# Patient Record
Sex: MALE | Race: WHITE | NOT HISPANIC OR LATINO | Employment: FULL TIME | URBAN - METROPOLITAN AREA
[De-identification: names, ages, dates, MRNs, and addresses within clinical notes are randomized per-mention and may not be internally consistent; named-entity substitution may affect disease eponyms.]

---

## 2018-05-03 ENCOUNTER — APPOINTMENT (OUTPATIENT)
Dept: CARDIOLOGY | Facility: HOSPITAL | Age: 33
End: 2018-05-03

## 2018-05-03 ENCOUNTER — HOSPITAL ENCOUNTER (EMERGENCY)
Facility: HOSPITAL | Age: 33
Discharge: HOME | End: 2018-05-03
Attending: EMERGENCY MEDICINE | Admitting: EMERGENCY MEDICINE

## 2018-05-03 VITALS
OXYGEN SATURATION: 98 % | HEART RATE: 72 BPM | RESPIRATION RATE: 16 BRPM | TEMPERATURE: 98.2 F | SYSTOLIC BLOOD PRESSURE: 126 MMHG | BODY MASS INDEX: 22.16 KG/M2 | WEIGHT: 182 LBS | HEIGHT: 76 IN | DIASTOLIC BLOOD PRESSURE: 80 MMHG

## 2018-05-03 DIAGNOSIS — I82.4Z1 ACUTE DEEP VEIN THROMBOSIS (DVT) OF DISTAL VEIN OF RIGHT LOWER EXTREMITY (HCC): Primary | ICD-10-CM

## 2018-05-03 LAB
ALBUMIN SERPL-MCNC: 4.3 G/DL (ref 3.5–5.2)
ALBUMIN/GLOB SERPL: 1.4 G/DL
ALP SERPL-CCNC: 53 U/L (ref 39–117)
ALT SERPL W P-5'-P-CCNC: 12 U/L (ref 1–41)
ANION GAP SERPL CALCULATED.3IONS-SCNC: 11.6 MMOL/L
AST SERPL-CCNC: 16 U/L (ref 1–40)
BASOPHILS # BLD AUTO: 0.02 10*3/MM3 (ref 0–0.2)
BASOPHILS NFR BLD AUTO: 0.3 % (ref 0–1.5)
BILIRUB SERPL-MCNC: 0.6 MG/DL (ref 0.1–1.2)
BUN BLD-MCNC: 14 MG/DL (ref 6–20)
BUN/CREAT SERPL: 12.4 (ref 7–25)
CALCIUM SPEC-SCNC: 9.4 MG/DL (ref 8.6–10.5)
CHLORIDE SERPL-SCNC: 101 MMOL/L (ref 98–107)
CO2 SERPL-SCNC: 29.4 MMOL/L (ref 22–29)
CREAT BLD-MCNC: 1.13 MG/DL (ref 0.76–1.27)
DEPRECATED RDW RBC AUTO: 40.1 FL (ref 37–54)
EOSINOPHIL # BLD AUTO: 0.2 10*3/MM3 (ref 0–0.7)
EOSINOPHIL NFR BLD AUTO: 2.8 % (ref 0.3–6.2)
ERYTHROCYTE [DISTWIDTH] IN BLOOD BY AUTOMATED COUNT: 12.6 % (ref 11.5–14.5)
GFR SERPL CREATININE-BSD FRML MDRD: 75 ML/MIN/1.73
GFR SERPL CREATININE-BSD FRML MDRD: 91 ML/MIN/1.73
GLOBULIN UR ELPH-MCNC: 3 GM/DL
GLUCOSE BLD-MCNC: 93 MG/DL (ref 65–99)
HCT VFR BLD AUTO: 42.6 % (ref 40.4–52.2)
HGB BLD-MCNC: 14.6 G/DL (ref 13.7–17.6)
HOLD SPECIMEN: NORMAL
HOLD SPECIMEN: NORMAL
IMM GRANULOCYTES # BLD: 0.02 10*3/MM3 (ref 0–0.03)
IMM GRANULOCYTES NFR BLD: 0.3 % (ref 0–0.5)
LYMPHOCYTES # BLD AUTO: 2.46 10*3/MM3 (ref 0.9–4.8)
LYMPHOCYTES NFR BLD AUTO: 35 % (ref 19.6–45.3)
MCH RBC QN AUTO: 30.3 PG (ref 27–32.7)
MCHC RBC AUTO-ENTMCNC: 34.3 G/DL (ref 32.6–36.4)
MCV RBC AUTO: 88.4 FL (ref 79.8–96.2)
MONOCYTES # BLD AUTO: 0.74 10*3/MM3 (ref 0.2–1.2)
MONOCYTES NFR BLD AUTO: 10.5 % (ref 5–12)
NEUTROPHILS # BLD AUTO: 3.6 10*3/MM3 (ref 1.9–8.1)
NEUTROPHILS NFR BLD AUTO: 51.4 % (ref 42.7–76)
PLATELET # BLD AUTO: 239 10*3/MM3 (ref 140–500)
PMV BLD AUTO: 10.4 FL (ref 6–12)
POTASSIUM BLD-SCNC: 4.1 MMOL/L (ref 3.5–5.2)
PROT SERPL-MCNC: 7.3 G/DL (ref 6–8.5)
RBC # BLD AUTO: 4.82 10*6/MM3 (ref 4.6–6)
SODIUM BLD-SCNC: 142 MMOL/L (ref 136–145)
WBC NRBC COR # BLD: 7.02 10*3/MM3 (ref 4.5–10.7)
WHOLE BLOOD HOLD SPECIMEN: NORMAL
WHOLE BLOOD HOLD SPECIMEN: NORMAL

## 2018-05-03 PROCEDURE — 99284 EMERGENCY DEPT VISIT MOD MDM: CPT

## 2018-05-03 PROCEDURE — 85025 COMPLETE CBC W/AUTO DIFF WBC: CPT

## 2018-05-03 PROCEDURE — 80053 COMPREHEN METABOLIC PANEL: CPT

## 2018-05-03 PROCEDURE — 93971 EXTREMITY STUDY: CPT

## 2018-05-03 PROCEDURE — 36415 COLL VENOUS BLD VENIPUNCTURE: CPT

## 2018-05-03 RX ADMIN — RIVAROXABAN 15 MG: 15 TABLET, FILM COATED ORAL at 22:03

## 2018-05-04 ENCOUNTER — TELEPHONE (OUTPATIENT)
Dept: SOCIAL WORK | Facility: HOSPITAL | Age: 33
End: 2018-05-04

## 2018-05-04 LAB
BH CV LOW VAS RIGHT GASTRONEMIUS VESSEL: 1
BH CV LOWER VASCULAR LEFT COMMON FEMORAL AUGMENT: NORMAL
BH CV LOWER VASCULAR LEFT COMMON FEMORAL COMPETENT: NORMAL
BH CV LOWER VASCULAR LEFT COMMON FEMORAL COMPRESS: NORMAL
BH CV LOWER VASCULAR LEFT COMMON FEMORAL PHASIC: NORMAL
BH CV LOWER VASCULAR LEFT COMMON FEMORAL SPONT: NORMAL
BH CV LOWER VASCULAR RIGHT COMMON FEMORAL AUGMENT: NORMAL
BH CV LOWER VASCULAR RIGHT COMMON FEMORAL COMPETENT: NORMAL
BH CV LOWER VASCULAR RIGHT COMMON FEMORAL COMPRESS: NORMAL
BH CV LOWER VASCULAR RIGHT COMMON FEMORAL PHASIC: NORMAL
BH CV LOWER VASCULAR RIGHT COMMON FEMORAL SPONT: NORMAL
BH CV LOWER VASCULAR RIGHT DISTAL FEMORAL COMPRESS: NORMAL
BH CV LOWER VASCULAR RIGHT GASTRONEMIUS COMPRESS: NORMAL
BH CV LOWER VASCULAR RIGHT GASTRONEMIUS THROMBUS: NORMAL
BH CV LOWER VASCULAR RIGHT GREATER SAPH AK COMPRESS: NORMAL
BH CV LOWER VASCULAR RIGHT GREATER SAPH BK COMPRESS: NORMAL
BH CV LOWER VASCULAR RIGHT MID FEMORAL AUGMENT: NORMAL
BH CV LOWER VASCULAR RIGHT MID FEMORAL COMPETENT: NORMAL
BH CV LOWER VASCULAR RIGHT MID FEMORAL COMPRESS: NORMAL
BH CV LOWER VASCULAR RIGHT MID FEMORAL PHASIC: NORMAL
BH CV LOWER VASCULAR RIGHT MID FEMORAL SPONT: NORMAL
BH CV LOWER VASCULAR RIGHT PERONEAL COMPRESS: NORMAL
BH CV LOWER VASCULAR RIGHT POPLITEAL AUGMENT: NORMAL
BH CV LOWER VASCULAR RIGHT POPLITEAL COMPETENT: NORMAL
BH CV LOWER VASCULAR RIGHT POPLITEAL COMPRESS: NORMAL
BH CV LOWER VASCULAR RIGHT POPLITEAL PHASIC: NORMAL
BH CV LOWER VASCULAR RIGHT POPLITEAL SPONT: NORMAL
BH CV LOWER VASCULAR RIGHT POSTERIOR TIBIAL COMPRESS: NORMAL
BH CV LOWER VASCULAR RIGHT PROXIMAL FEMORAL COMPRESS: NORMAL
BH CV LOWER VASCULAR RIGHT SAPHENOFEMORAL JUNCTION AUGMENT: NORMAL
BH CV LOWER VASCULAR RIGHT SAPHENOFEMORAL JUNCTION COMPRESS: NORMAL
BH CV LOWER VASCULAR RIGHT SAPHENOFEMORAL JUNCTION PHASIC: NORMAL
BH CV LOWER VASCULAR RIGHT SAPHENOFEMORAL JUNCTION SPONT: NORMAL

## 2018-05-04 NOTE — DISCHARGE INSTRUCTIONS
You are advised to follow closely with Primary doctor of your choice in 2-3 days for recheck, final results of lab work and imaging testing, and further testing/treatment as needed.    Please take xarelto as directed twice daily, then you will need to switch to once daily dosing.    Please return to the emergency department immediately with chest pain, shortness of air, abdominal pain, persistent vomiting/fever, blood in emesis or stool, lightheadedness/fainting, problems with speech, one sided weakness/numbness, new incontinence, problems with vision, bleeding or for worsening of symptoms or other concerns.

## 2018-05-04 NOTE — TELEPHONE ENCOUNTER
ER F/U phone call:   Pt states that he was able to get a PCP appointment on 5-9-18 and that he had no difficulty obtaining his Xarelto. No other questions or concerns voiced at this time. Greta Reeder RN

## 2018-05-04 NOTE — ED PROVIDER NOTES
" [facility] EMERGENCY DEPARTMENT ENCOUNTER    CHIEF COMPLAINT  Chief Complaint: RLE pain  History given by: patient  History limited by: nothing  [facility] Room Number: 49/49  PMD: No Known Provider      HPI:  Pt is a 33 y.o. male who presents complaining of right lower leg pain for about 3 days that is worsened with palpation and movement. Pt states he believes he has blood clots in his leg due to his father having Factor V Leiden issue. He reports the sensation of his R calf \"feels different\" against his pants. Pt is not on blood thinners. He denies CP, palpitations, lightheadness or SOA. Pt has not had any long distance travel recently.    Onset: gradual  Duration: 3 days  Severity: moderate  Associated symptoms: different sensation of right lower leg  Previous treatment: Pt reports no treatment PTA.    PAST MEDICAL HISTORY  Active Ambulatory Problems     Diagnosis Date Noted   • No Active Ambulatory Problems     Resolved Ambulatory Problems     Diagnosis Date Noted   • No Resolved Ambulatory Problems     No Additional Past Medical History       PAST SURGICAL HISTORY  History reviewed. No pertinent surgical history.    FAMILY HISTORY  History reviewed. No pertinent family history.    SOCIAL HISTORY  Social History     Social History   • Marital status: Single     Spouse name: N/A   • Number of children: N/A   • Years of education: N/A     Occupational History   • Not on file.     Social History Main Topics   • Smoking status: Never Smoker   • Smokeless tobacco: Not on file   • Alcohol use Yes      Comment: occationally   • Drug use: No   • Sexual activity: Defer     Other Topics Concern   • Not on file     Social History Narrative   • No narrative on file       ALLERGIES  Review of patient's allergies indicates no known allergies.    REVIEW OF SYSTEMS  Review of Systems   Constitutional: Negative for fever.   HENT: Negative for nosebleeds.    Eyes: Negative for visual disturbance.   Respiratory: Negative for shortness of " breath.    Cardiovascular: Negative for chest pain.   Gastrointestinal: Negative for abdominal pain and blood in stool.   Genitourinary: Negative for hematuria.   Musculoskeletal: Positive for myalgias (right calf).   Neurological: Negative for light-headedness.   Psychiatric/Behavioral: Negative for confusion.       PHYSICAL EXAM  ED Triage Vitals   Temp Heart Rate Resp BP SpO2   05/03/18 1917 05/03/18 1917 05/03/18 1917 05/03/18 1934 05/03/18 1917   98.2 °F (36.8 °C) 101 16 143/77 98 %      Temp src Heart Rate Source Patient Position BP Location FiO2 (%)   05/03/18 1917 05/03/18 1917 05/03/18 1934 -- --   Tympanic Monitor Sitting         Physical Exam   Constitutional: He is oriented to person, place, and time. No distress.   HENT:   Head: Normocephalic and atraumatic.   Eyes: EOM are normal.   Neck: Normal range of motion.   Cardiovascular: Normal rate, regular rhythm and normal heart sounds.    No murmur heard.  Pulses:       Posterior tibial pulses are 2+ on the right side, and 2+ on the left side.   HR 70s   Pulmonary/Chest: Effort normal and breath sounds normal. No respiratory distress. He has no wheezes.   Lungs are CTAB   Abdominal: Soft. Bowel sounds are normal. There is no tenderness. There is no rebound and no guarding.   Musculoskeletal: Normal range of motion. He exhibits no edema.   Mild right calf tenderness.   Neurological: He is alert and oriented to person, place, and time.   Skin: Skin is warm and dry.   Psychiatric: Affect normal.   Nursing note and vitals reviewed.      LAB RESULTS  Lab Results (last 24 hours)     Procedure Component Value Units Date/Time    CBC & Differential [[id number]] Collected:  05/03/18 1944    Specimen:  Blood Updated:  05/03/18 1955    Narrative:       The following orders were created for panel order CBC & Differential.  Procedure                               Abnormality         Status                     ---------                               -----------          ------                     CBC Auto Differential[[id number]]        Normal              Final result                 Please view results for these tests on the individual orders.    Comprehensive Metabolic Panel [[id number]]  (Abnormal) Collected:  05/03/18 1944    Specimen:  Blood Updated:  05/03/18 2016     Glucose 93 mg/dL      BUN 14 mg/dL      Creatinine 1.13 mg/dL      Sodium 142 mmol/L      Potassium 4.1 mmol/L      Chloride 101 mmol/L      CO2 29.4 (H) mmol/L      Calcium 9.4 mg/dL      Total Protein 7.3 g/dL      Albumin 4.30 g/dL      ALT (SGPT) 12 U/L      AST (SGOT) 16 U/L      Alkaline Phosphatase 53 U/L      Total Bilirubin 0.6 mg/dL      eGFR Non African Amer 75 mL/min/1.73      eGFR  African Amer 91 mL/min/1.73      Globulin 3.0 gm/dL      A/G Ratio 1.4 g/dL      BUN/Creatinine Ratio 12.4     Anion Gap 11.6 mmol/L     CBC Auto Differential [[id number]]  (Normal) Collected:  05/03/18 1944    Specimen:  Blood Updated:  05/03/18 1955     WBC 7.02 10*3/mm3      RBC 4.82 10*6/mm3      Hemoglobin 14.6 g/dL      Hematocrit 42.6 %      MCV 88.4 fL      MCH 30.3 pg      MCHC 34.3 g/dL      RDW 12.6 %      RDW-SD 40.1 fl      MPV 10.4 fL      Platelets 239 10*3/mm3      Neutrophil % 51.4 %      Lymphocyte % 35.0 %      Monocyte % 10.5 %      Eosinophil % 2.8 %      Basophil % 0.3 %      Immature Grans % 0.3 %      Neutrophils, Absolute 3.60 10*3/mm3      Lymphocytes, Absolute 2.46 10*3/mm3      Monocytes, Absolute 0.74 10*3/mm3      Eosinophils, Absolute 0.20 10*3/mm3      Basophils, Absolute 0.02 10*3/mm3      Immature Grans, Absolute 0.02 10*3/mm3           I ordered the above labs and reviewed the results      PROCEDURES  Procedures      PROGRESS AND CONSULTS  ED Course   2129  Notified pt of calf clot in right leg. Discussed plan to d/c home on blood thinner and discuss options with his brother, who is an endocrinologist since pt does not have PCP.  PT denies any history of bleeding, recent  trauma/surgeries.    2137  Discussed pt's case and doppler result with pt's brother who agrees with plan of care. Will place pt on xarelto. Pt understands and agrees with the plan to follow closely for recheck and further testing, treatment as needed including hematology and PCP follow up, all questions answered.    2148  CCP at bedside discussing medication and referral for PCP.      MEDICAL DECISION MAKING  Results were reviewed/discussed with the patient and they were also made aware of online access. Pt also made aware that some labs, such as cultures, will not be resulted during ER visit and follow up with PMD is necessary.     MDM  Number of Diagnoses or Management Options  Acute deep vein thrombosis (DVT) of distal vein of right lower extremity:      Amount and/or Complexity of Data Reviewed  Clinical lab tests: ordered and reviewed (Labs are unremarkable.)  Tests in the radiology section of CPT®: ordered and reviewed (Doppler of RLE is positive for new calf DVT.)  Discuss the patient with other providers: yes (Pt's brother (endocrinologist))  Independent visualization of images, tracings, or specimens: yes    Patient Progress  Patient progress: stable         DIAGNOSIS  Final diagnoses:   Acute deep vein thrombosis (DVT) of distal vein of right lower extremity       DISPOSITION  DISCHARGE    Patient discharged in stable condition.    Reviewed implications of results, diagnosis, meds, responsibility to follow up, warning signs and symptoms of possible worsening, potential complications and reasons to return to ER.    Patient/Family voiced understanding of above instructions.    Discussed plan for discharge, as there is no emergent indication for admission. Patient referred to primary care provider for BP management due to today's BP. Pt/family is agreeable and understands need for follow up and repeat testing.  Pt is aware that discharge does not mean that nothing is wrong but it indicates no emergency is  present that requires admission and they must continue care with follow-up as given below or physician of their choice.     FOLLOW-UP  [facility] PHYS[facility] REFERRAL SERVICE  [place] [address]  [phone]  Schedule an appointment as soon as possible for a visit in 3 days  [facility]    [facility] [place] [address]  [phone]  Schedule an appointment as soon as possible for a visit in 3 days      Jorgito Fernández MD  [address]  [place] [address]  [phone]    Schedule an appointment as soon as possible for a visit in 1 week  As needed         Medication List      New Prescriptions    rivaroxaban 15 MG tablet  Commonly known as:  XARELTO  Take 1 tablet by mouth 2 (Two) Times a Day With Meals.          Latest Documented Vital Signs:  As of 9:51 PM  BP- 129/85 HR- 64 Temp- 98.2 °F (36.8 °C) (Tympanic) O2 sat- 97%    --  Documentation assistance provided by ghassan Stevens for Dr. Oviedo.  Information recorded by the scribe was done at my direction and has been verified and validated by me.     Luz Elena Stevens  05/03/18 0153       Inocencia Oviedo MD  05/04/18 6790

## 2018-05-04 NOTE — PROGRESS NOTES
Discharge Planning Assessment   [facility]     Patient Name: Cristian Feliciano  MRN: [id number]  Today's Date: 5/3/2018    Admit Date: 5/3/2018          Discharge Needs Assessment    No documentation.             Discharge Plan     Row Name 05/03/18 2153       Plan    Final Note Xarelto packet of information with discount card given to pt. Educated pt on how to use the discount card. Also [facility] list given to pt with instructions on how to use the referral line.        Destination     No service coordination in this encounter.      Durable Medical Equipment     No service coordination in this encounter.      Dialysis/Infusion     No service coordination in this encounter.      Home Medical Care     No service coordination in this encounter.      Social Care     No service coordination in this encounter.                Demographic Summary    No documentation.           Functional Status    No documentation.           Psychosocial    No documentation.           Abuse/Neglect    No documentation.           Legal    No documentation.           Substance Abuse    No documentation.           Patient Forms    No documentation.         Greta Reeder RN

## 2018-05-04 NOTE — ED NOTES
Pt states his right leg felt different a couple days ago and has gradually gotten more uncomfortable, so pt came to the ER.      Shanon Lay RN  05/03/18 7482

## 2018-05-04 NOTE — PROGRESS NOTES
EV doppler completed.  Preliminary results:  RT leg is positive for new calf DVT with no extension into the popliteal.  Results given to Janes Golden.

## 2018-05-09 ENCOUNTER — OFFICE VISIT (OUTPATIENT)
Dept: FAMILY MEDICINE CLINIC | Facility: CLINIC | Age: 33
End: 2018-05-09

## 2018-05-09 VITALS
HEIGHT: 76 IN | SYSTOLIC BLOOD PRESSURE: 132 MMHG | HEART RATE: 65 BPM | DIASTOLIC BLOOD PRESSURE: 50 MMHG | OXYGEN SATURATION: 99 % | BODY MASS INDEX: 22.53 KG/M2 | WEIGHT: 185 LBS

## 2018-05-09 DIAGNOSIS — I82.401 ACUTE DEEP VEIN THROMBOSIS (DVT) OF RIGHT LOWER EXTREMITY, UNSPECIFIED VEIN (HCC): Primary | ICD-10-CM

## 2018-05-09 PROCEDURE — 99202 OFFICE O/P NEW SF 15 MIN: CPT | Performed by: NURSE PRACTITIONER

## 2018-05-09 NOTE — PROGRESS NOTES
"Cristian Feliciano is a 33 y.o. male.Patient presents for a hospital follow up. Cristian was seen at [facility] ER on 5/3/18 for a DVT of the right lower extremity. He was placed on Xarelto and referred to hematology, but didn't realize that he was supposed to make the follow up appointment himself.  Reports that his father has Factor 5 leiden    Assessment/Plan   Problem List Items Addressed This Visit     None      Visit Diagnoses     Acute deep vein thrombosis (DVT) of right lower extremity, unspecified vein    -  Primary    Relevant Orders    Ambulatory Referral to Hematology             No Follow-up on file.  There are no Patient Instructions on file for this visit.    Chief Complaint   Patient presents with   • Deep Vein Thrombosis     Social History   Substance Use Topics   • Smoking status: Never Smoker   • Smokeless tobacco: Not on file   • Alcohol use Yes      Comment: occationally       History of Present Illness     The following portions of the patient's history were reviewed and updated as appropriate:PMHroutine: Social history , Allergies, Current Medications, Active Problem List, Family History and Health Maintenance    Review of Systems   Constitutional: Negative for activity change and appetite change.   Respiratory: Negative for cough and shortness of breath.    Cardiovascular: Negative for chest pain.   Musculoskeletal: Negative for myalgias.       Objective   Vitals:    05/09/18 1345   BP: 132/50   Pulse: 65   SpO2: 99%   Weight: 83.9 kg (185 lb)   Height: 193 cm (76\")     Body mass index is 22.52 kg/m².  Physical Exam   Constitutional: He appears well-developed and well-nourished. No distress.   HENT:   Head: Normocephalic and atraumatic.   Cardiovascular: Normal rate and regular rhythm.    Pulmonary/Chest: Effort normal.   Musculoskeletal: Normal range of motion. He exhibits no edema or tenderness.   Neurological: He is alert.   Skin: Skin is warm.   Nursing note and vitals reviewed.    Reviewed " Data:  Admission on 05/03/2018, Discharged on 05/03/2018   Component Date Value Ref Range Status   • Glucose 05/03/2018 93  65 - 99 mg/dL Final   • BUN 05/03/2018 14  6 - 20 mg/dL Final   • Creatinine 05/03/2018 1.13  0.76 - 1.27 mg/dL Final   • Sodium 05/03/2018 142  136 - 145 mmol/L Final   • Potassium 05/03/2018 4.1  3.5 - 5.2 mmol/L Final   • Chloride 05/03/2018 101  98 - 107 mmol/L Final   • CO2 05/03/2018 29.4* 22.0 - 29.0 mmol/L Final   • Calcium 05/03/2018 9.4  8.6 - 10.5 mg/dL Final   • Total Protein 05/03/2018 7.3  6.0 - 8.5 g/dL Final   • Albumin 05/03/2018 4.30  3.50 - 5.20 g/dL Final   • ALT (SGPT) 05/03/2018 12  1 - 41 U/L Final   • AST (SGOT) 05/03/2018 16  1 - 40 U/L Final   • Alkaline Phosphatase 05/03/2018 53  39 - 117 U/L Final   • Total Bilirubin 05/03/2018 0.6  0.1 - 1.2 mg/dL Final   • eGFR Non African Amer 05/03/2018 75  >60 mL/min/1.73 Final   • eGFR   Amer 05/03/2018 91  >60 mL/min/1.73 Final   • Globulin 05/03/2018 3.0  gm/dL Final   • A/G Ratio 05/03/2018 1.4  g/dL Final   • BUN/Creatinine Ratio 05/03/2018 12.4  7.0 - 25.0 Final   • Anion Gap 05/03/2018 11.6  mmol/L Final   • Right GastronemiusSoleal Vessel 05/04/2018 1   Final   • Right Common Femoral Spont 05/04/2018 Y   Final   • Right Common Femoral Phasic 05/04/2018 Y   Final   • Right Common Femoral Augment 05/04/2018 Y   Final   • Right Common Femoral Competent 05/04/2018 Y   Final   • Right Common Femoral Compress 05/04/2018 C   Final   • Right Saphenofemoral Junction Spont 05/04/2018 Y   Final   • Right Saphenofemoral Junction Phas* 05/04/2018 Y   Final   • Right Saphenofemoral Junction Augm* 05/04/2018 Y   Final   • Right Saphenofemoral Junction Comp* 05/04/2018 C   Final   • Right Proximal Femoral Compress 05/04/2018 C   Final   • Right Mid Femoral Spont 05/04/2018 Y   Final   • Right Mid Femoral Phasic 05/04/2018 Y   Final   • Right Mid Femoral Augment 05/04/2018 Y   Final   • Right Mid Femoral Competent 05/04/2018 Y    Final   • Right Mid Femoral Compress 05/04/2018 C   Final   • Right Distal Femoral Compress 05/04/2018 C   Final   • Right Popliteal Spont 05/04/2018 Y   Final   • Right Popliteal Phasic 05/04/2018 Y   Final   • Right Popliteal Augment 05/04/2018 Y   Final   • Right Popliteal Competent 05/04/2018 Y   Final   • Right Popliteal Compress 05/04/2018 C   Final   • Right Posterior Tibial Compress 05/04/2018 C   Final   • Right Peroneal Compress 05/04/2018 C   Final   • Right GastronemiusSoleal Thrombus 05/04/2018 A   Final   • Right Greater Saph AK Compress 05/04/2018 C   Final   • Right Greater Saph BK Compress 05/04/2018 C   Final   • Left Common Femoral Spont 05/04/2018 Y   Final   • Left Common Femoral Phasic 05/04/2018 Y   Final   • Left Common Femoral Augment 05/04/2018 Y   Final   • Left Common Femoral Competent 05/04/2018 Y   Final   • Left Common Femoral Compress 05/04/2018 C   Final   • Right GastronemiusSoleal Compress 05/04/2018 N   Final   • Extra Tube 05/03/2018 hold for add-on   Final   • Extra Tube 05/03/2018 Hold for add-ons.   Final   • Extra Tube 05/03/2018 hold for add-on   Final   • Extra Tube 05/03/2018 Hold for add-ons.   Final   • WBC 05/03/2018 7.02  4.50 - 10.70 10*3/mm3 Final   • RBC 05/03/2018 4.82  4.60 - 6.00 10*6/mm3 Final   • Hemoglobin 05/03/2018 14.6  13.7 - 17.6 g/dL Final   • Hematocrit 05/03/2018 42.6  40.4 - 52.2 % Final   • MCV 05/03/2018 88.4  79.8 - 96.2 fL Final   • MCH 05/03/2018 30.3  27.0 - 32.7 pg Final   • MCHC 05/03/2018 34.3  32.6 - 36.4 g/dL Final   • RDW 05/03/2018 12.6  11.5 - 14.5 % Final   • RDW-SD 05/03/2018 40.1  37.0 - 54.0 fl Final   • MPV 05/03/2018 10.4  6.0 - 12.0 fL Final   • Platelets 05/03/2018 239  140 - 500 10*3/mm3 Final   • Neutrophil % 05/03/2018 51.4  42.7 - 76.0 % Final   • Lymphocyte % 05/03/2018 35.0  19.6 - 45.3 % Final   • Monocyte % 05/03/2018 10.5  5.0 - 12.0 % Final   • Eosinophil % 05/03/2018 2.8  0.3 - 6.2 % Final   • Basophil % 05/03/2018 0.3   0.0 - 1.5 % Final   • Immature Grans % 05/03/2018 0.3  0.0 - 0.5 % Final   • Neutrophils, Absolute 05/03/2018 3.60  1.90 - 8.10 10*3/mm3 Final   • Lymphocytes, Absolute 05/03/2018 2.46  0.90 - 4.80 10*3/mm3 Final   • Monocytes, Absolute 05/03/2018 0.74  0.20 - 1.20 10*3/mm3 Final   • Eosinophils, Absolute 05/03/2018 0.20  0.00 - 0.70 10*3/mm3 Final   • Basophils, Absolute 05/03/2018 0.02  0.00 - 0.20 10*3/mm3 Final   • Immature Grans, Absolute 05/03/2018 0.02  0.00 - 0.03 10*3/mm3 Final     Referral to hematology

## 2018-05-29 RX ORDER — RIVAROXABAN 15 MG/1
TABLET, FILM COATED ORAL
Qty: 20 TABLET | Refills: 0 | Status: SHIPPED | OUTPATIENT
Start: 2018-05-29 | End: 2018-05-31

## 2018-05-31 ENCOUNTER — CONSULT (OUTPATIENT)
Dept: ONCOLOGY | Facility: CLINIC | Age: 33
End: 2018-05-31

## 2018-05-31 ENCOUNTER — LAB (OUTPATIENT)
Dept: LAB | Facility: HOSPITAL | Age: 33
End: 2018-05-31

## 2018-05-31 VITALS
HEIGHT: 76 IN | HEART RATE: 72 BPM | BODY MASS INDEX: 22.77 KG/M2 | TEMPERATURE: 98.8 F | RESPIRATION RATE: 16 BRPM | DIASTOLIC BLOOD PRESSURE: 80 MMHG | OXYGEN SATURATION: 97 % | SYSTOLIC BLOOD PRESSURE: 120 MMHG | WEIGHT: 187 LBS

## 2018-05-31 DIAGNOSIS — I82.90 ACUTE DEEP VEIN THROMBOSIS (DVT) OF NON-EXTREMITY VEIN: Primary | ICD-10-CM

## 2018-05-31 LAB
BASOPHILS # BLD AUTO: 0.04 10*3/MM3 (ref 0–0.1)
BASOPHILS NFR BLD AUTO: 0.7 % (ref 0–1.1)
D DIMER PPP FEU-MCNC: <0.27 MCGFEU/ML (ref 0–0.49)
DEPRECATED RDW RBC AUTO: 36.7 FL (ref 37–49)
EOSINOPHIL # BLD AUTO: 0.15 10*3/MM3 (ref 0–0.36)
EOSINOPHIL NFR BLD AUTO: 2.6 % (ref 1–5)
ERYTHROCYTE [DISTWIDTH] IN BLOOD BY AUTOMATED COUNT: 12.1 % (ref 11.7–14.5)
F5 GENE MUT ANL BLD/T: ABNORMAL
FACTOR II, DNA ANALYSIS: NORMAL
HCT VFR BLD AUTO: 43.1 % (ref 40–49)
HGB BLD-MCNC: 15.6 G/DL (ref 13.5–16.5)
IMM GRANULOCYTES # BLD: 0.06 10*3/MM3 (ref 0–0.03)
IMM GRANULOCYTES NFR BLD: 1 % (ref 0–0.5)
LYMPHOCYTES # BLD AUTO: 2.32 10*3/MM3 (ref 1–3.5)
LYMPHOCYTES NFR BLD AUTO: 39.9 % (ref 20–49)
MCH RBC QN AUTO: 30.5 PG (ref 27–33)
MCHC RBC AUTO-ENTMCNC: 36.2 G/DL (ref 32–35)
MCV RBC AUTO: 84.3 FL (ref 83–97)
MONOCYTES # BLD AUTO: 0.56 10*3/MM3 (ref 0.25–0.8)
MONOCYTES NFR BLD AUTO: 9.6 % (ref 4–12)
NEUTROPHILS # BLD AUTO: 2.69 10*3/MM3 (ref 1.5–7)
NEUTROPHILS NFR BLD AUTO: 46.2 % (ref 39–75)
NRBC BLD MANUAL-RTO: 0 /100 WBC (ref 0–0)
PLATELET # BLD AUTO: 241 10*3/MM3 (ref 150–375)
PMV BLD AUTO: 10.4 FL (ref 8.9–12.1)
RBC # BLD AUTO: 5.11 10*6/MM3 (ref 4.3–5.5)
WBC NRBC COR # BLD: 5.82 10*3/MM3 (ref 4–10)

## 2018-05-31 PROCEDURE — 85306 CLOT INHIBIT PROT S FREE: CPT | Performed by: INTERNAL MEDICINE

## 2018-05-31 PROCEDURE — 85379 FIBRIN DEGRADATION QUANT: CPT | Performed by: INTERNAL MEDICINE

## 2018-05-31 PROCEDURE — 85240 CLOT FACTOR VIII AHG 1 STAGE: CPT | Performed by: INTERNAL MEDICINE

## 2018-05-31 PROCEDURE — 36415 COLL VENOUS BLD VENIPUNCTURE: CPT | Performed by: INTERNAL MEDICINE

## 2018-05-31 PROCEDURE — 81241 F5 GENE: CPT | Performed by: INTERNAL MEDICINE

## 2018-05-31 PROCEDURE — 81240 F2 GENE: CPT | Performed by: INTERNAL MEDICINE

## 2018-05-31 PROCEDURE — 99204 OFFICE O/P NEW MOD 45 MIN: CPT | Performed by: INTERNAL MEDICINE

## 2018-05-31 PROCEDURE — 86038 ANTINUCLEAR ANTIBODIES: CPT | Performed by: INTERNAL MEDICINE

## 2018-05-31 PROCEDURE — 85303 CLOT INHIBIT PROT C ACTIVITY: CPT | Performed by: INTERNAL MEDICINE

## 2018-05-31 PROCEDURE — 85025 COMPLETE CBC W/AUTO DIFF WBC: CPT | Performed by: INTERNAL MEDICINE

## 2018-05-31 NOTE — PROGRESS NOTES
Subjective significant improvement in pain involving the right calf    REASON FOR CONSULTATION:  Right lower extremity DVT  Provide an opinion on any further workup or treatment                             REQUESTING PHYSICIAN:  Greta MOLINA    RECORDS OBTAINED:  Records of the patients history including those obtained from the referring provider were reviewed and summarized in detail.    HISTORY OF PRESENT ILLNESS:  The patient is a 33 y.o. year old male who is here for an opinion about the above issue.    History of Present Illness     The patient is a 33-year-old male with little past medical history who it noted just prior to emergency room visit beginning of May development of pain in his right lower extremity for 3 days.  This was worsening with movement and he became concerned there being a family history of factor V Leiden's father.  A Doppler examination in the emergency room showed acute right lower extremity DVT and gastrocnemius/soleal and the patient was placed on Xarelto.  Testing for factor V Leiden does not appear to have been obtained.      The patient describes that his father is positive for factor V Leiden and that this is been known for many years with his father being treated long-term on warfarin.  As result of needing orthopedic procedures and bridging between these surgeries he has an IVC filter also in place there being instances where he evidently developed new thrombi.  There is no significant family history otherwise though his mother does have cardiovascular disease and is status post pacemaker placement.    The patient himself works in [employer] after previously working as a  upon obtaining his degree.  His current occupation has him traveling frequently by  car for short trips with her also occasions, including prior to his development of DVT, where he was relatively sedentary for many hours over several days at a desk.  He did not have any longer trips in  and around the development of his DVT where he was immobile, had had no recent surgery or injury that led to immobility.  He does describe previously injuring his ankle many years ago and being mobilized without DVT .  He is, for the most part, reasonably active in exercise including basketball.    As to his recent development of DVT.  Noted gradual pain in his right calf over several days, again during periods of working at a desk, until the pain became further significant and concerning for DVT.  The patient talked with his father at this point to confirm his suspicions and then sought evaluation.  The patient was placed on Xarelto, as noted above, and is just completing twice a day dosing for 3 weeks.  This pain has significantly improved to the point of of resolution.  His return to normal activity and additionally notes that his wife is pregnant with their first child.    Past Medical History:   Diagnosis Date   • DVT (deep venous thrombosis)         No past surgical history on file.     Current Outpatient Prescriptions on File Prior to Visit   Medication Sig Dispense Refill   • XARELTO 15 MG tablet TAKE 1 TABLET BY MOUTH TWICE A DAY WITH MEALS 20 tablet 0     No current facility-administered medications on file prior to visit.         ALLERGIES:  No Known Allergies     Social History     Social History   • Marital status: Single     Occupational History   •  [employer]     Social History Main Topics   • Smoking status: Never Smoker   • Alcohol use Yes      Comment: occationally   • Drug use: No   • Sexual activity: Defer     Other Topics Concern   • Not on file        Family History   Problem Relation Age of Onset   • Factor V Leiden deficiency Father    • Heart disease Mother    • Hypertension Maternal Grandmother    • Hypertension Maternal Grandfather    • Hypertension Paternal Grandmother    • Hypertension Paternal Grandfather         Review of Systems   Review of Systems  "  Constitutional: Negative for fever.   HENT: Negative for nosebleeds.    Eyes: Negative for visual disturbance.   Respiratory: Negative for shortness of breath.    Cardiovascular: Negative for chest pain.   Gastrointestinal: Negative for abdominal pain and blood in stool.   Genitourinary: Negative for hematuria.   Musculoskeletal: Resolution of lower extremity pain.  Neurological: Negative for light-headedness.  The patient does describe 1-2 times per year what may be an atypical migraine  Psychiatric/Behavioral: Negative for confusion  Objective     Vitals:    05/31/18 1037   BP: 120/80   Pulse: 72   Resp: 16   Temp: 98.8 °F (37.1 °C)   SpO2: 97%  Comment: at rest   Weight: 84.8 kg (187 lb)   Height: 193 cm (75.98\")  Comment: new ht w/shoes   PainSc: 0-No pain     Current Status 5/31/2018   ECOG score 0       Physical Exam    Constitutional: He is oriented to person, place, and time. No distress.   HENT:   Head: Normocephalic and atraumatic.   Eyes: EOM are normal.   Neck: Normal range of motion.   Cardiovascular: Normal rate, regular rhythm and normal heart sounds.    No murmur heard.  Pulses:       Posterior tibial pulses are 2+ on the right side, and 2+ on the left side.   HR 70s   Pulmonary/Chest: Effort normal and breath sounds normal. No respiratory distress. He has no wheezes.   Lungs are clear bilaterally to auscultation and percussion  Abdominal: Soft. Bowel sounds are normal. There is no tenderness. There is no rebound and no guarding.   Musculoskeletal: Normal range of motion. He exhibits no edema.  There is no calf tenderness nor evidence of palpable cord for bilateral calf regions.    Neurological: He is alert and oriented to person, place, and time.  Cranial nerves II through XII tested and found grossly intact, deep tendon reflexes 1+ both proximally and distally   Skin: Skin is warm and dry.   Psychiatric: Affect normal.   RECENT LABS:  Hematology WBC   Date Value Ref Range Status   05/31/2018 5.82 " 4.00 - 10.00 10*3/mm3 Final     RBC   Date Value Ref Range Status   05/31/2018 5.11 4.30 - 5.50 10*6/mm3 Final     Hemoglobin   Date Value Ref Range Status   05/31/2018 15.6 13.5 - 16.5 g/dL Final     Hematocrit   Date Value Ref Range Status   05/31/2018 43.1 40.0 - 49.0 % Final     Platelets   Date Value Ref Range Status   05/31/2018 241 150 - 375 10*3/mm3 Final          Assessment/Plan          33-year-old male with no significant past medical history, though with a family history of factor V Leiden in his father, who, after a recent episode of minimal immobility, developed a right calf vein thrombosis.  There is no significant other provocative etiology for the patient's calf vein thrombosis and he is been treated over the last 3 weeks with Xarelto demonstrated rapid improvement to the point of resolution of his pain by the time he is seen for this consultation.  In review of additional symptoms he does have what sounds like an atypical migraine which rapidly improves over 1-2 days with rest and reduction of light.  He has no lasting neurologic deficits thereafter.  After a long discussion approximately 45 minutes per his family history and current social circumstances it is felt reasonable to have him tested in a more general sense for hypercoagulable state though expecting to at least be heterozygous for factor V Leiden.  In this way we could eliminate other hypercoagulable conditions at baseline, and also be able to provide information to he and his wife for any other genetic conditions for hypercoagulability that might affect their children.  This should also help us determine length of anticoagulation which is felt to be at least 3 months at which time we'll reassess.  Doppler and if negative take him off Xarelto using prophylaxis for subsequent episodes where he may have any immobility or perioperative status.  He is agreeable to this plan and hypercoagulable assessment will be drawn this afternoon.  He'll  be seen back in follow-up in 3-4 weeks to review these results.  A prescription for Xarelto at 20 mg by mouth daily will be E-scribed to his pharmacy starting today.

## 2018-06-01 LAB
ANA SER QL: NEGATIVE
CARDIOLIPIN IGG SER IA-ACNC: <9 GPL U/ML (ref 0–14)
CARDIOLIPIN IGM SER IA-ACNC: <9 MPL U/ML (ref 0–12)
HCYS SERPL-SCNC: 10.3 UMOL/L (ref 0–15)

## 2018-06-02 LAB
B2 GLYCOPROT1 IGA SER-ACNC: <9 GPI IGA UNITS (ref 0–25)
B2 GLYCOPROT1 IGG SER-ACNC: <9 GPI IGG UNITS (ref 0–20)
B2 GLYCOPROT1 IGM SER-ACNC: <9 GPI IGM UNITS (ref 0–32)
PROT S FREE PPP-ACNC: 118 % (ref 57–157)
PROT S PPP-ACNC: 68 % (ref 60–150)

## 2018-06-03 LAB
FACT VIII ACT/NOR PPP: 80 % ACTIVITY (ref 60–150)
Lab: NORMAL
PROT C AG PPP IA-ACNC: 105 % (ref 60–150)
PROT C PPP-ACNC: 159 % (ref 86–163)
PROT S ACT/NOR PPP: 139 % (ref 70–127)

## 2018-06-05 LAB
APTT HEX PL PPP: 9 SEC
APTT IMM NP PPP: 30.5 SEC
APTT PPP 1:1 SALINE: 49.6 SEC
APTT PPP: 36.2 SEC
B2 GLYCOPROT1 IGA SER-ACNC: <10 SAU
B2 GLYCOPROT1 IGG SER-ACNC: <10 SGU
B2 GLYCOPROT1 IGM SER-ACNC: <10 SMU
CARDIOLIPIN IGG SER IA-ACNC: <10 GPL
CARDIOLIPIN IGM SER IA-ACNC: <10 MPL
CONFIRM DRVVT: 59.5 SEC
INR PPP: 1.3 RATIO
LAC INTERPRETATION: ABNORMAL
PLATELET NEUTRALIZATION: 0 SEC
PROTHROMBIN TIME: 13.2 SEC
SCREEN DRVVT/NORMAL: 1.5 RATIO
SCREEN DRVVT: 99.4 SEC
THROMBIN TIME: 20 SEC

## 2018-06-21 ENCOUNTER — OFFICE VISIT (OUTPATIENT)
Dept: ONCOLOGY | Facility: CLINIC | Age: 33
End: 2018-06-21

## 2018-06-21 ENCOUNTER — APPOINTMENT (OUTPATIENT)
Dept: LAB | Facility: HOSPITAL | Age: 33
End: 2018-06-21

## 2018-06-21 VITALS
TEMPERATURE: 98.8 F | DIASTOLIC BLOOD PRESSURE: 64 MMHG | SYSTOLIC BLOOD PRESSURE: 114 MMHG | WEIGHT: 188 LBS | HEIGHT: 76 IN | RESPIRATION RATE: 16 BRPM | OXYGEN SATURATION: 97 % | HEART RATE: 64 BPM | BODY MASS INDEX: 22.89 KG/M2

## 2018-06-21 DIAGNOSIS — I82.90 ACUTE DEEP VEIN THROMBOSIS (DVT) OF NON-EXTREMITY VEIN: Primary | ICD-10-CM

## 2018-06-21 DIAGNOSIS — D68.51 FACTOR V LEIDEN MUTATION (HCC): ICD-10-CM

## 2018-06-21 PROCEDURE — G0463 HOSPITAL OUTPT CLINIC VISIT: HCPCS | Performed by: INTERNAL MEDICINE

## 2018-06-21 PROCEDURE — 99213 OFFICE O/P EST LOW 20 MIN: CPT | Performed by: INTERNAL MEDICINE

## 2018-06-21 NOTE — PROGRESS NOTES
Subjective continued significant improvement in pain involving the right calf    REASON FOR CONSULTATION:  Right lower extremity DVT  Provide an opinion on any further workup or treatment                             REQUESTING PHYSICIAN:  Greta MOLINA    History of Present Illness     The patient is a 33-year-old male with little past medical history who it noted just prior to emergency room visit beginning of May development of pain in his right lower extremity for 3 days.  This was worsening with movement and he became concerned there being a family history of factor V Leiden's father.  A Doppler examination in the emergency room showed acute right lower extremity DVT and gastrocnemius/soleal and the patient was placed on Xarelto.  Testing for factor V Leiden does not appear to have been obtained.      The patient describes that his father is positive for factor V Leiden and that this is been known for many years with his father being treated long-term on warfarin.  As result of needing orthopedic procedures and bridging between these surgeries he has an IVC filter also in place there being instances where he evidently developed new thrombi.  There is no significant family history otherwise though his mother does have cardiovascular disease and is status post pacemaker placement.    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  His current occupation has him traveling frequently by  car for short trips with her also occasions, including prior to his development of DVT, where he was relatively sedentary for many hours over several days at a desk.  He did not have any longer trips in and around the development of his DVT where he was immobile, had had no recent surgery or injury that led to immobility.  He does describe previously injuring his ankle many years ago and being mobilized without DVT .  He is, for the most part, reasonably  active in exercise including basketball.    As to his recent development of DVT.  Noted gradual pain in his right calf over several days, again during periods of working at a desk, until the pain became further significant and concerning for DVT.  The patient talked with his father at this point to confirm his suspicions and then sought evaluation.  The patient was placed on Xarelto, as noted above, and is just completing twice a day dosing for 3 weeks.  This pain has significantly improved to the point of of resolution.  His return to normal activity and additionally notes that his wife is pregnant with their first child.      At the patient's initial assessment he was asked to undergo a hypercoagulable workup to be certain of any confounding additional very other than likely factor V Leiden.  Fortunately these results were negative except for his heterozygosity factor V Leiden mutation ([id number]).  He continues to do well on Xarelto at this point and we discussed a 3 month treatment plan at which time he'll be reviewed by repeat Doppler examinations.    Past Medical History:   Diagnosis Date   • DVT (deep venous thrombosis)         No past surgical history on file.     Current Outpatient Prescriptions on File Prior to Visit   Medication Sig Dispense Refill   • rivaroxaban (XARELTO) 20 MG tablet Take 1 tablet by mouth Daily With Dinner. 30 tablet 3     No current facility-administered medications on file prior to visit.         ALLERGIES:  No Known Allergies     Social History     Social History   • Marital status: Significant Other     Spouse name: Sloane Jenkins   • Years of education: College     Occupational History   •  [employer]     Social History Main Topics   • Smoking status: Former Smoker     Types: Cigarettes   • Smokeless tobacco: Never Used   • Alcohol use Yes      Comment: occasionally   • Drug use: No   • Sexual activity: Defer     Other Topics Concern   • Not on  "file        Family History   Problem Relation Age of Onset   • Factor V Leiden deficiency Father    • Heart disease Mother    • Hypertension Maternal Grandmother    • Hypertension Maternal Grandfather    • Hypertension Paternal Grandmother    • Hypertension Paternal Grandfather    • No Known Problems Brother         Review of Systems   Review of Systems   Constitutional: Negative for fever.   HENT: Negative for nosebleeds.    Eyes: Negative for visual disturbance.   Respiratory: Negative for shortness of breath.    Cardiovascular: Negative for chest pain.   Gastrointestinal: Negative for abdominal pain and blood in stool.   Genitourinary: Negative for hematuria.   Musculoskeletal: Resolution of lower extremity pain.  Neurological: Negative for light-headedness.  The patient does describe 1-2 times per year what may be an atypical migraine  Psychiatric/Behavioral: Negative for confusion  Objective     Vitals:    06/21/18 1537   BP: 114/64   Pulse: 64   Resp: 16   Temp: 98.8 °F (37.1 °C)   TempSrc: Oral   SpO2: 97%   Weight: 85.3 kg (188 lb)   Height: 193 cm (75.98\")   PainSc: 0-No pain     Current Status 6/21/2018   ECOG score 0       Physical Exam    Constitutional: He is oriented to person, place, and time. No distress.   HENT:   Head: Normocephalic and atraumatic.   Eyes: EOM are normal.   Neck: Normal range of motion.   Cardiovascular: Normal rate, regular rhythm and normal heart sounds.    No murmur heard.  Pulses:       Posterior tibial pulses are 2+ on the right side, and 2+ on the left side.   HR 70s   Pulmonary/Chest: Effort normal and breath sounds normal. No respiratory distress. He has no wheezes.   Lungs are clear bilaterally to auscultation and percussion  Abdominal: Soft. Bowel sounds are normal. There is no tenderness. There is no rebound and no guarding.   Musculoskeletal: Normal range of motion. He exhibits no edema.  There is no calf tenderness nor evidence of palpable cord for bilateral calf " regions.    Neurological: He is alert and oriented to person, place, and time.  Cranial nerves II through XII tested and found grossly intact, deep tendon reflexes 1+ both proximally and distally   Skin: Skin is warm and dry.   Psychiatric: Affect normal.   RECENT LABS:  Hematology WBC   Date Value Ref Range Status   05/31/2018 5.82 4.00 - 10.00 10*3/mm3 Final     RBC   Date Value Ref Range Status   05/31/2018 5.11 4.30 - 5.50 10*6/mm3 Final     Hemoglobin   Date Value Ref Range Status   05/31/2018 15.6 13.5 - 16.5 g/dL Final     Hematocrit   Date Value Ref Range Status   05/31/2018 43.1 40.0 - 49.0 % Final     Platelets   Date Value Ref Range Status   05/31/2018 241 150 - 375 10*3/mm3 Final          Assessment/Plan          33-year-old male with no significant past medical history, though with a family history of factor V Leiden in his father, who, after a recent episode of minimal immobility, developed a right calf vein thrombosis.  There is no significant other provocative etiology for the patient's calf vein thrombosis and he is been treated over the last 3 weeks with Xarelto demonstrated rapid improvement to the point of resolution of his pain by the time he is seen for this consultation.  In review of additional symptoms he does have what sounds like an atypical migraine which rapidly improves over 1-2 days with rest and reduction of light.  He has no lasting neurologic deficits thereafter.  After a long discussion approximately 45 minutes per his family history and current social circumstances it is felt reasonable to have him tested in a more general sense for hypercoagulable state though expecting to at least be heterozygous for factor V Leiden.  In this way we could eliminate other hypercoagulable conditions at baseline, and also be able to provide information to he and his wife for any other genetic conditions for hypercoagulability that might affect their children.  This should also help us determine  length of anticoagulation which is felt to be at least 3 months at which time we'll reassess.  Doppler and if negative take him off Xarelto using prophylaxis for subsequent episodes where he may have any immobility or perioperative status.  He is agreeable to this plan and hypercoagulable assessment will be drawn this afternoon.  He'll be seen back in follow-up in 3-4 weeks to review these results.  A prescription for Xarelto at 20 mg by mouth Daily was E-scribed to his pharmacy.       The patient's hypercoagulable workup proceeded and he is seen back June 21, 2018 reviewing the results and finding only heterozygosity for factor V Leiden.  He continues to do well on Xarelto when discussed a 3 month treatment duration.  Fortunately additional samples were available for him to nearly complete his treatment schedule.  Plan:  *Repeat lower extremity Dopplers in mid-August  *Continue Xarelto 20 mg by mouth daily  *M.D. assessment 1 week after Doppler examinations to determine status  *Pending those results that the patient comes also Xarelto routinely then we will use the 10 mg dose and prophylactic fashion thereafter prolonged trips and/or perioperative conditions.

## 2018-08-09 ENCOUNTER — LAB (OUTPATIENT)
Dept: LAB | Facility: HOSPITAL | Age: 33
End: 2018-08-09

## 2018-08-09 DIAGNOSIS — D68.51 FACTOR V LEIDEN MUTATION (HCC): Primary | ICD-10-CM

## 2018-08-09 LAB
BASOPHILS # BLD AUTO: 0.03 10*3/MM3 (ref 0–0.1)
BASOPHILS NFR BLD AUTO: 0.4 % (ref 0–1.1)
DEPRECATED RDW RBC AUTO: 36.4 FL (ref 37–49)
EOSINOPHIL # BLD AUTO: 0.1 10*3/MM3 (ref 0–0.36)
EOSINOPHIL NFR BLD AUTO: 1.4 % (ref 1–5)
ERYTHROCYTE [DISTWIDTH] IN BLOOD BY AUTOMATED COUNT: 11.8 % (ref 11.7–14.5)
HCT VFR BLD AUTO: 43.7 % (ref 40–49)
HGB BLD-MCNC: 15.5 G/DL (ref 13.5–16.5)
IMM GRANULOCYTES # BLD: 0.06 10*3/MM3 (ref 0–0.03)
IMM GRANULOCYTES NFR BLD: 0.8 % (ref 0–0.5)
LYMPHOCYTES # BLD AUTO: 2.2 10*3/MM3 (ref 1–3.5)
LYMPHOCYTES NFR BLD AUTO: 30.2 % (ref 20–49)
MCH RBC QN AUTO: 30.3 PG (ref 27–33)
MCHC RBC AUTO-ENTMCNC: 35.5 G/DL (ref 32–35)
MCV RBC AUTO: 85.4 FL (ref 83–97)
MONOCYTES # BLD AUTO: 0.65 10*3/MM3 (ref 0.25–0.8)
MONOCYTES NFR BLD AUTO: 8.9 % (ref 4–12)
NEUTROPHILS # BLD AUTO: 4.25 10*3/MM3 (ref 1.5–7)
NEUTROPHILS NFR BLD AUTO: 58.3 % (ref 39–75)
NRBC BLD MANUAL-RTO: 0 /100 WBC (ref 0–0)
PLATELET # BLD AUTO: 175 10*3/MM3 (ref 150–375)
PMV BLD AUTO: 11.1 FL (ref 8.9–12.1)
RBC # BLD AUTO: 5.12 10*6/MM3 (ref 4.3–5.5)
WBC NRBC COR # BLD: 7.29 10*3/MM3 (ref 4–10)

## 2018-08-09 PROCEDURE — 85025 COMPLETE CBC W/AUTO DIFF WBC: CPT | Performed by: INTERNAL MEDICINE

## 2018-08-09 PROCEDURE — 36415 COLL VENOUS BLD VENIPUNCTURE: CPT | Performed by: INTERNAL MEDICINE

## 2018-08-16 ENCOUNTER — APPOINTMENT (OUTPATIENT)
Dept: LAB | Facility: HOSPITAL | Age: 33
End: 2018-08-16

## 2018-08-16 ENCOUNTER — OFFICE VISIT (OUTPATIENT)
Dept: ONCOLOGY | Facility: CLINIC | Age: 33
End: 2018-08-16

## 2018-08-16 VITALS
HEART RATE: 62 BPM | HEIGHT: 76 IN | WEIGHT: 187.6 LBS | DIASTOLIC BLOOD PRESSURE: 68 MMHG | RESPIRATION RATE: 16 BRPM | SYSTOLIC BLOOD PRESSURE: 108 MMHG | BODY MASS INDEX: 22.84 KG/M2 | TEMPERATURE: 98.3 F | OXYGEN SATURATION: 96 %

## 2018-08-16 DIAGNOSIS — D68.51 FACTOR V LEIDEN MUTATION (HCC): ICD-10-CM

## 2018-08-16 DIAGNOSIS — I82.90 ACUTE DEEP VEIN THROMBOSIS (DVT) OF NON-EXTREMITY VEIN: Primary | ICD-10-CM

## 2018-08-16 PROCEDURE — 99213 OFFICE O/P EST LOW 20 MIN: CPT | Performed by: INTERNAL MEDICINE

## 2018-08-16 PROCEDURE — G0463 HOSPITAL OUTPT CLINIC VISIT: HCPCS | Performed by: INTERNAL MEDICINE

## 2018-08-16 NOTE — PROGRESS NOTES
Subjective continued significant improvement in pain involving the right calf    REASON FOR CONSULTATION:  Right lower extremity DVT  Provide an opinion on any further workup or treatment                             REQUESTING PHYSICIAN:  Greta MOLINA    History of Present Illness     The patient is a 33-year-old male with little past medical history who it noted just prior to emergency room visit beginning of May development of pain in his right lower extremity for 3 days.  This was worsening with movement and he became concerned there being a family history of factor V Leiden's father.  A Doppler examination in the emergency room showed acute right lower extremity DVT and gastrocnemius/soleal and the patient was placed on Xarelto.  Testing for factor V Leiden does not appear to have been obtained.      The patient describes that his father is positive for factor V Leiden and that this is been known for many years with his father being treated long-term on warfarin.  As result of needing orthopedic procedures and bridging between these surgeries he has an IVC filter also in place there being instances where he evidently developed new thrombi.  There is no significant family history otherwise though his mother does have cardiovascular disease and is status post pacemaker placement.    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  His current occupation has him traveling frequently by  car for short trips with her also occasions, including prior to his development of DVT, where he was relatively sedentary for many hours over several days at a desk.  He did not have any longer trips in and around the development of his DVT where he was immobile, had had no recent surgery or injury that led to immobility.  He does describe previously injuring his ankle many years ago and being mobilized without DVT .  He is, for the most part, reasonably  active in exercise including basketball.    As to his recent development of DVT.  Noted gradual pain in his right calf over several days, again during periods of working at a desk, until the pain became further significant and concerning for DVT.  The patient talked with his father at this point to confirm his suspicions and then sought evaluation.  The patient was placed on Xarelto, as noted above, and is just completing twice a day dosing for 3 weeks.  This pain has significantly improved to the point of of resolution.  His return to normal activity and additionally notes that his wife is pregnant with their first child.      At the patient's initial assessment he was asked to undergo a hypercoagulable workup to be certain of any confounding additional very other than likely factor V Leiden.  Fortunately these results were negative except for his heterozygosity factor V Leiden mutation (R506Q).  He continues to do well on Xarelto at this point and we discussed a 3 month treatment plan at which time he'll be reviewed by repeat Doppler examinations.    The patient is next seen August 16, 2018.  He did not have follow-up Doppler ultrasounds with uncertain about residual thrombosis or not.  We have discussed the need to determine resolution with Xarelto under the circumstance to know about discontinuance of the medication and/or its use in a prophylactic fashion.    Past Medical History:   Diagnosis Date   • DVT (deep venous thrombosis) (CMS/HCC)         No past surgical history on file.     Current Outpatient Prescriptions on File Prior to Visit   Medication Sig Dispense Refill   • rivaroxaban (XARELTO) 20 MG tablet Take 1 tablet by mouth Daily With Dinner. 30 tablet 3     No current facility-administered medications on file prior to visit.         ALLERGIES:  No Known Allergies     Social History     Social History   • Marital status: Significant Other     Spouse name: Sloane Jenkins   • Years of education:  "College     Occupational History   •  [employer]     Social History Main Topics   • Smoking status: Former Smoker     Types: Cigarettes   • Smokeless tobacco: Never Used   • Alcohol use Yes      Comment: occasionally   • Drug use: No   • Sexual activity: Defer     Other Topics Concern   • Not on file        Family History   Problem Relation Age of Onset   • Factor V Leiden deficiency Father    • Heart disease Mother    • Hypertension Maternal Grandmother    • Hypertension Maternal Grandfather    • Hypertension Paternal Grandmother    • Hypertension Paternal Grandfather    • No Known Problems Brother         Review of Systems   Review of Systems   Constitutional: Negative for fever.   HENT: Negative for nosebleeds.    Eyes: Negative for visual disturbance.   Respiratory: Negative for shortness of breath.    Cardiovascular: Negative for chest pain.   Gastrointestinal: Negative for abdominal pain and blood in stool.   Genitourinary: Negative for hematuria.   Musculoskeletal: Resolution of lower extremity pain.  Neurological: Negative for light-headedness.  The patient does describe 1-2 times per year what may be an atypical migraine  Psychiatric/Behavioral: Negative for confusion  Objective     Vitals:    08/16/18 1355   BP: 108/68   Pulse: 62   Resp: 16   Temp: 98.3 °F (36.8 °C)   TempSrc: Oral   SpO2: 96%   Weight: 85.1 kg (187 lb 9.6 oz)   Height: 193 cm (75.98\")   PainSc: 0-No pain     Current Status 8/16/2018   ECOG score 0       Physical Exam    Constitutional: He is oriented to person, place, and time. No distress.   HENT:   Head: Normocephalic and atraumatic.   Eyes: EOM are normal.   Neck: Normal range of motion.   Cardiovascular: Normal rate, regular rhythm and normal , no murmur or gallop  Pulmonary/Chest: Effort normal and breath sounds normal. No respiratory distress. He has no wheezes.   Lungs are clear bilaterally to auscultation and percussion  Abdominal: Soft. Bowel " sounds are normal. There is no tenderness. There is no rebound and no guarding.   Musculoskeletal: Normal range of motion. He exhibits no edema.  There is no calf tenderness nor evidence of palpable cord for bilateral calf regions.    Neurological: He is alert and oriented to person, place, and time.  Cranial nerves II through XII tested and found grossly intact, deep tendon reflexes 1+ both proximally and distally   Skin: Skin is warm and dry.   Psychiatric: Affect normal.   RECENT LABS:  Hematology WBC   Date Value Ref Range Status   08/09/2018 7.29 4.00 - 10.00 10*3/mm3 Final     RBC   Date Value Ref Range Status   08/09/2018 5.12 4.30 - 5.50 10*6/mm3 Final     Hemoglobin   Date Value Ref Range Status   08/09/2018 15.5 13.5 - 16.5 g/dL Final     Hematocrit   Date Value Ref Range Status   08/09/2018 43.7 40.0 - 49.0 % Final     Platelets   Date Value Ref Range Status   08/09/2018 175 150 - 375 10*3/mm3 Final          Assessment/Plan          33-year-old male with no significant past medical history, though with a family history of factor V Leiden in his father, who, after a recent episode of minimal immobility, developed a right calf vein thrombosis.  There is no significant other provocative etiology for the patient's calf vein thrombosis and he is been treated over the last 3 weeks with Xarelto demonstrated rapid improvement to the point of resolution of his pain by the time he is seen for this consultation.  In review of additional symptoms he does have what sounds like an atypical migraine which rapidly improves over 1-2 days with rest and reduction of light.  He has no lasting neurologic deficits thereafter.  After a long discussion approximately 45 minutes per his family history and current social circumstances it is felt reasonable to have him tested in a more general sense for hypercoagulable state though expecting to at least be heterozygous for factor V Leiden.  In this way we could eliminate other  hypercoagulable conditions at baseline, and also be able to provide information to he and his wife for any other genetic conditions for hypercoagulability that might affect their children.  This should also help us determine length of anticoagulation which is felt to be at least 3 months at which time we'll reassess.  Doppler and if negative take him off Xarelto using prophylaxis for subsequent episodes where he may have any immobility or perioperative status.  He is agreeable to this plan and hypercoagulable assessment will be drawn this afternoon.  He'll be seen back in follow-up in 3-4 weeks to review these results.  A prescription for Xarelto at 20 mg by mouth Daily was E-scribed to his pharmacy.       The patient's hypercoagulable workup proceeded and he is seen back June 21, 2018 reviewing the results and finding only heterozygosity for factor V Leiden.  He continues to do well on Xarelto when discussed a 3 month treatment duration.  Fortunately additional samples were available for him to nearly complete his treatment schedule.  We plan follow-up Doppler examinations of this has not occurred as he is seen August 16.  We plan to try to reschedule this examinations in approximately 3 weeks.  *Plan to request review of his Dopplers with a call to this physician that day  *Patient to continue Xarelto at current dosing until that study is completed .                                                              *Pending those results that the patient comes also Xarelto routinely then we will use the 10 mg dose and prophylactic fashion thereafter prolonged trips and/or perioperative conditions.

## 2018-09-06 ENCOUNTER — HOSPITAL ENCOUNTER (OUTPATIENT)
Dept: CARDIOLOGY | Facility: HOSPITAL | Age: 33
Discharge: HOME | End: 2018-09-06
Attending: INTERNAL MEDICINE | Admitting: INTERNAL MEDICINE

## 2018-09-06 DIAGNOSIS — I82.90 ACUTE DEEP VEIN THROMBOSIS (DVT) OF NON-EXTREMITY VEIN: ICD-10-CM

## 2018-09-06 LAB
BH CV LOW VAS RIGHT GASTRONEMIUS VESSEL: 1
BH CV LOW VAS RIGHT SAPHENOFEMORAL JUNCTION SPONT: 1
BH CV LOWER VASCULAR LEFT COMMON FEMORAL AUGMENT: NORMAL
BH CV LOWER VASCULAR LEFT COMMON FEMORAL COMPETENT: NORMAL
BH CV LOWER VASCULAR LEFT COMMON FEMORAL COMPRESS: NORMAL
BH CV LOWER VASCULAR LEFT COMMON FEMORAL PHASIC: NORMAL
BH CV LOWER VASCULAR LEFT COMMON FEMORAL SPONT: NORMAL
BH CV LOWER VASCULAR LEFT DISTAL FEMORAL COMPRESS: NORMAL
BH CV LOWER VASCULAR LEFT GASTRONEMIUS COMPRESS: NORMAL
BH CV LOWER VASCULAR LEFT GREATER SAPH AK COMPRESS: NORMAL
BH CV LOWER VASCULAR LEFT GREATER SAPH BK COMPRESS: NORMAL
BH CV LOWER VASCULAR LEFT LESSER SAPH COMPRESS: NORMAL
BH CV LOWER VASCULAR LEFT MID FEMORAL AUGMENT: NORMAL
BH CV LOWER VASCULAR LEFT MID FEMORAL COMPETENT: NORMAL
BH CV LOWER VASCULAR LEFT MID FEMORAL COMPRESS: NORMAL
BH CV LOWER VASCULAR LEFT MID FEMORAL PHASIC: NORMAL
BH CV LOWER VASCULAR LEFT MID FEMORAL SPONT: NORMAL
BH CV LOWER VASCULAR LEFT PERONEAL COMPRESS: NORMAL
BH CV LOWER VASCULAR LEFT POPLITEAL AUGMENT: NORMAL
BH CV LOWER VASCULAR LEFT POPLITEAL COMPETENT: NORMAL
BH CV LOWER VASCULAR LEFT POPLITEAL COMPRESS: NORMAL
BH CV LOWER VASCULAR LEFT POPLITEAL PHASIC: NORMAL
BH CV LOWER VASCULAR LEFT POPLITEAL SPONT: NORMAL
BH CV LOWER VASCULAR LEFT POSTERIOR TIBIAL COMPRESS: NORMAL
BH CV LOWER VASCULAR LEFT PROXIMAL FEMORAL COMPRESS: NORMAL
BH CV LOWER VASCULAR LEFT SAPHENOFEMORAL JUNCTION AUGMENT: NORMAL
BH CV LOWER VASCULAR LEFT SAPHENOFEMORAL JUNCTION COMPETENT: NORMAL
BH CV LOWER VASCULAR LEFT SAPHENOFEMORAL JUNCTION COMPRESS: NORMAL
BH CV LOWER VASCULAR LEFT SAPHENOFEMORAL JUNCTION PHASIC: NORMAL
BH CV LOWER VASCULAR LEFT SAPHENOFEMORAL JUNCTION SPONT: NORMAL
BH CV LOWER VASCULAR RIGHT COMMON FEMORAL AUGMENT: NORMAL
BH CV LOWER VASCULAR RIGHT COMMON FEMORAL COMPETENT: NORMAL
BH CV LOWER VASCULAR RIGHT COMMON FEMORAL COMPRESS: NORMAL
BH CV LOWER VASCULAR RIGHT COMMON FEMORAL PHASIC: NORMAL
BH CV LOWER VASCULAR RIGHT COMMON FEMORAL SPONT: NORMAL
BH CV LOWER VASCULAR RIGHT DISTAL FEMORAL COMPRESS: NORMAL
BH CV LOWER VASCULAR RIGHT GASTRONEMIUS COMPRESS: NORMAL
BH CV LOWER VASCULAR RIGHT GASTRONEMIUS THROMBUS: NORMAL
BH CV LOWER VASCULAR RIGHT GREATER SAPH AK COMPRESS: NORMAL
BH CV LOWER VASCULAR RIGHT GREATER SAPH BK COMPRESS: NORMAL
BH CV LOWER VASCULAR RIGHT LESSER SAPH COMPRESS: NORMAL
BH CV LOWER VASCULAR RIGHT MID FEMORAL AUGMENT: NORMAL
BH CV LOWER VASCULAR RIGHT MID FEMORAL COMPETENT: NORMAL
BH CV LOWER VASCULAR RIGHT MID FEMORAL COMPRESS: NORMAL
BH CV LOWER VASCULAR RIGHT MID FEMORAL PHASIC: NORMAL
BH CV LOWER VASCULAR RIGHT MID FEMORAL SPONT: NORMAL
BH CV LOWER VASCULAR RIGHT PERONEAL COMPRESS: NORMAL
BH CV LOWER VASCULAR RIGHT POPLITEAL AUGMENT: NORMAL
BH CV LOWER VASCULAR RIGHT POPLITEAL COMPETENT: NORMAL
BH CV LOWER VASCULAR RIGHT POPLITEAL COMPRESS: NORMAL
BH CV LOWER VASCULAR RIGHT POPLITEAL PHASIC: NORMAL
BH CV LOWER VASCULAR RIGHT POPLITEAL SPONT: NORMAL
BH CV LOWER VASCULAR RIGHT POSTERIOR TIBIAL COMPRESS: NORMAL
BH CV LOWER VASCULAR RIGHT PROXIMAL FEMORAL COMPRESS: NORMAL
BH CV LOWER VASCULAR RIGHT SAPHENOFEMORAL JUNCTION AUGMENT: NORMAL
BH CV LOWER VASCULAR RIGHT SAPHENOFEMORAL JUNCTION COMPETENT: NORMAL
BH CV LOWER VASCULAR RIGHT SAPHENOFEMORAL JUNCTION COMPRESS: NORMAL
BH CV LOWER VASCULAR RIGHT SAPHENOFEMORAL JUNCTION PHASIC: NORMAL
BH CV LOWER VASCULAR RIGHT SAPHENOFEMORAL JUNCTION SPONT: NORMAL

## 2018-09-06 PROCEDURE — 93970 EXTREMITY STUDY: CPT

## 2018-10-12 ENCOUNTER — TELEPHONE (OUTPATIENT)
Dept: ONCOLOGY | Facility: CLINIC | Age: 33
End: 2018-10-12

## 2018-10-12 NOTE — TELEPHONE ENCOUNTER
Pt left a message that he would like to know his blood type. Unable to find any type and crossmatch results in pt's chart to determine his blood type. Message left on pt's VM.

## 2018-10-12 NOTE — TELEPHONE ENCOUNTER
----- Message from Cindy Madera sent at 10/12/2018 10:43 AM EDT -----  Regarding: question  Contact: [phone]  Pt states he is wanting to know his blood type

## 2018-12-04 ENCOUNTER — TELEPHONE (OUTPATIENT)
Dept: PHARMACY | Facility: HOSPITAL | Age: 33
End: 2018-12-04

## 2018-12-04 RX ORDER — RIVAROXABAN 20 MG/1
TABLET, FILM COATED ORAL
Qty: 30 TABLET | Refills: 3 | OUTPATIENT
Start: 2018-12-04

## 2018-12-04 NOTE — TELEPHONE ENCOUNTER
----- Message from Jorgito Fernández MD sent at 12/3/2018  5:57 PM EST -----  It looks like we can drop his dose to 10 mg for when necessary use. ThanksJACINTO  ----- Message -----  From: Colleen Ross  Sent: 12/3/2018  11:56 AM  To: MD Dr Pradeep Velazco, pt had a Doppler done in September. Pt is now requesting a refill on Xarelto 20mg QD. According to dictation, his dose of Xarelto was to be adjusted accordingly after the Doppler. I was just wondering if I should continue filling his 20mg or have triage phone in a new script for 10mg. Please advise. Thanks Colleen.

## 2018-12-04 NOTE — TELEPHONE ENCOUNTER
Per Dr. Fernández, pt can stop his daily Xarelto 20mg and we can call in 10mg prn prior to prolonged trips. Called pt and LVM advising him on this. Escribed to pt's pharmacy.

## 2019-01-03 RX ORDER — RIVAROXABAN 20 MG/1
TABLET, FILM COATED ORAL
Qty: 30 TABLET | Refills: 3 | OUTPATIENT
Start: 2019-01-03

## 2019-05-29 RX ORDER — RIVAROXABAN 10 MG/1
TABLET, FILM COATED ORAL
Qty: 90 TABLET | Refills: 0 | Status: SHIPPED | OUTPATIENT
Start: 2019-05-29 | End: 2019-08-17 | Stop reason: SDUPTHER

## 2019-08-19 RX ORDER — RIVAROXABAN 10 MG/1
TABLET, FILM COATED ORAL
Qty: 90 TABLET | Refills: 0 | Status: SHIPPED | OUTPATIENT
Start: 2019-08-19 | End: 2019-11-18 | Stop reason: SDUPTHER

## 2019-08-20 ENCOUNTER — TELEPHONE (OUTPATIENT)
Dept: PHARMACY | Facility: HOSPITAL | Age: 34
End: 2019-08-20

## 2019-08-20 NOTE — TELEPHONE ENCOUNTER
----- Message from Concha Graner sent at 8/19/2019  3:18 PM EDT -----  Pt stats when he was here  had him get an ultrasound and they went over the results over the phone. No follow up was needed per the pt.  Thanks   ----- Message -----  From: Colleen Ross  Sent: 8/19/2019   1:18 PM  To: k On[facility]am[facility]  Pool    Please call pt to schedule an appt with DR Fernández. Pt hasn't been seen since August 2018. Dr Fernández didn't put a return next date in the dictation. Please let me know if pt says that Dr Fernández didn't need to see him again. I will need to notate that in his profile. Thanks Colleen. BTW he's getting a 90 day supply still.

## 2019-11-18 RX ORDER — RIVAROXABAN 10 MG/1
TABLET, FILM COATED ORAL
Qty: 90 TABLET | Refills: 1 | Status: SHIPPED | OUTPATIENT
Start: 2019-11-18 | End: 2020-05-14 | Stop reason: SDUPTHER

## 2020-02-17 ENCOUNTER — TELEPHONE (OUTPATIENT)
Dept: ONCOLOGY | Facility: CLINIC | Age: 35
End: 2020-02-17

## 2020-02-17 NOTE — TELEPHONE ENCOUNTER
I received a request for FIDELIA WOOD On Xarelto 10 mg # 90. Talked with patient informed him that he needed to see Dr Fernández for refills. L/V was 8-16-18. Has scheduled apt for 3-11-20 [facility] was not out of medication. I called [facility] and imormed them not to fill. Patient would be seeing doctor.  RH

## 2020-03-11 ENCOUNTER — APPOINTMENT (OUTPATIENT)
Dept: LAB | Facility: HOSPITAL | Age: 35
End: 2020-03-11

## 2020-03-20 ENCOUNTER — APPOINTMENT (OUTPATIENT)
Dept: LAB | Facility: HOSPITAL | Age: 35
End: 2020-03-20

## 2020-05-11 ENCOUNTER — APPOINTMENT (OUTPATIENT)
Dept: LAB | Facility: HOSPITAL | Age: 35
End: 2020-05-11

## 2020-05-14 ENCOUNTER — TELEMEDICINE (OUTPATIENT)
Dept: ONCOLOGY | Facility: CLINIC | Age: 35
End: 2020-05-14

## 2020-05-14 DIAGNOSIS — D68.51 FACTOR V LEIDEN MUTATION (HCC): Primary | ICD-10-CM

## 2020-05-14 DIAGNOSIS — I82.90 ACUTE DEEP VEIN THROMBOSIS (DVT) OF NON-EXTREMITY VEIN: ICD-10-CM

## 2020-05-14 PROCEDURE — 99214 OFFICE O/P EST MOD 30 MIN: CPT | Performed by: INTERNAL MEDICINE

## 2020-05-14 NOTE — PROGRESS NOTES
Subjective This patient has now moved to [facility], video visit was unsuccessful and converted to telephone call.  He agrees to the discussion.    REASON FORFOLLOWUP:  Right lower extremity DVT      History of Present Illness     The patient is a 35-year-old male with little past medical history who it noted just prior to emergency room visit beginning of May, 2018 development of pain in his right lower extremity for 3 days.  This was worsening with movement and he became concerned there being a family history of factor V Leiden's father.  A Doppler examination in the emergency room showed acute right lower extremity DVT and gastrocnemius/soleal and the patient was placed on Xarelto.  Testing for factor V Leiden does not appear to have been obtained.      The patient describes that his father is positive for factor V Leiden and that this is been known for many years with his father being treated long-term on warfarin.  As result of needing orthopedic procedures and bridging between these surgeries he has an IVC filter also in place there being instances where he evidently developed new thrombi.  There is no significant family history otherwise though his mother does have cardiovascular disease and is status post pacemaker placement.    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  His current occupation has him traveling frequently by  car for short trips with her also occasions, including prior to his development of DVT, where he was relatively sedentary for many hours over several days at a desk.  He did not have any longer trips in and around the development of his DVT where he was immobile, had had no recent surgery or injury that led to immobility.  He does describe previously injuring his ankle many years ago and being mobilized without DVT .  He is, for the most part, reasonably active in exercise including basketball.    As to  his recent development of DVT.  Noted gradual pain in his right calf over several days, again during periods of working at a desk, until the pain became further significant and concerning for DVT.  The patient talked with his father at this point to confirm his suspicions and then sought evaluation.  The patient was placed on Xarelto, as noted above, and is just completing twice a day dosing for 3 weeks.  This pain has significantly improved to the point of of resolution.  His return to normal activity and additionally notes that his wife is pregnant with their first child.      At the patient's initial assessment he was asked to undergo a hypercoagulable workup to be certain of any confounding additional very other than likely factor V Leiden.  Fortunately these results were negative except for his heterozygosity factor V Leiden mutation (R506Q).  He continues to do well on Xarelto at this point and we discussed a 3 month treatment plan at which time he'll be reviewed by repeat Doppler examinations.    The patient is next seen August 16, 2018.  He did not have follow-up Doppler ultrasounds with uncertain about residual thrombosis or not.  We have discussed the need to determine resolution with Xarelto under the circumstance to know about discontinuance of the medication and/or its use in a prophylactic fashion.  The patient's subsequent Doppler examination September 6 revealed superficial venous valvular incompetence in the right saphenofemoral junction and chronic right lower extremity DVT in the gastrocnemius/soleal.  It was agreed, thereafter, that he would use Xarelto 10 mg p.o. daily if he had any degree of immobilization including longer-term travel of over 2 hours.  The patient did not keep regular appointments but was contacted for follow-up now (2020, and has moved to [facility] and been contacted by video visit which was not successful and converted to a telephone call.      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  COVID-Candace has changed this and he works from home primarily but, as result of concern about pain in his extremities on occasion he has used Xarelto at least 2-3 times a week particularly over the last month.  This includes his relocation now in [facility].  We discussed the COVID 19 appears to produce a hypercoagulable condition and that his use the medication is encouraged if there is even a small degree of immobility at this point and that he should have a supply available.    Past Medical History:   Diagnosis Date   • DVT (deep venous thrombosis) (CMS/[facility])     Right lower extremity        No past surgical history on file.     Current Outpatient Medications on File Prior to Visit   Medication Sig Dispense Refill   • XARELTO 10 MG tablet TAKE 1 TABLET DAILY AS DIRECTED BY PHYSICIAN 90 tablet 1     No current facility-administered medications on file prior to visit.         ALLERGIES:  No Known Allergies     Social History     Socioeconomic History   • Marital status: Significant Other     Spouse name: Sloane Jenkins   • Number of children: Not on file   • Years of education: College   • Highest education level: Not on file   Occupational History   • Occupation:      Employer: [employer]   Tobacco Use   • Smoking status: Former Smoker     Types: Cigarettes   • Smokeless tobacco: Never Used   Substance and Sexual Activity   • Alcohol use: Yes     Comment: occasionally   • Drug use: No   • Sexual activity: Defer        Family History   Problem Relation Age of Onset   • Factor V Leiden deficiency Father    • Heart disease Mother    • Hypertension Maternal Grandmother    • Hypertension Maternal Grandfather    • Hypertension Paternal Grandmother    • Hypertension Paternal  Grandfather    • No Known Problems Brother         Review of Systems   Review of Systems   Constitutional: Negative for fever.   HENT: Negative for nosebleeds.    Eyes: Negative for visual disturbance.   Respiratory: Negative for shortness of breath.    Cardiovascular: Negative for chest pain.   Gastrointestinal: Negative for abdominal pain and blood in stool.   Genitourinary: Negative for hematuria.   Musculoskeletal: Resolution of lower extremity pain.  Neurological: Negative for light-headedness.  The patient does describe 1-2 times per year what may be an atypical migraine  Psychiatric/Behavioral: Negative for confusion  Objective     There were no vitals filed for this visit.  Current Status 8/16/2018   ECOG score 0       Physical Exam physical exam not performed May 14, the below is kept in place for reference                                                                     constitutional: He is oriented to person, place, and time. No distress.   HENT:   Head: Normocephalic and atraumatic.   Eyes: EOM are normal.   Neck: Normal range of motion.   Cardiovascular: Normal rate, regular rhythm and normal , no murmur or gallop  Pulmonary/Chest: Effort normal and breath sounds normal. No respiratory distress. He has no wheezes.   Lungs are clear bilaterally to auscultation and percussion  Abdominal: Soft. Bowel sounds are normal. There is no tenderness. There is no rebound and no guarding.   Musculoskeletal: Normal range of motion. He exhibits no edema.  There is no calf tenderness nor evidence of palpable cord for bilateral calf regions.    Neurological: He is alert and oriented to person, place, and time.  Cranial nerves II through XII tested and found grossly intact, deep tendon reflexes 1+ both proximally and distally   Skin: Skin is warm and dry.   Psychiatric: Affect normal.   RECENT LABS:  Hematology WBC   Date Value Ref Range Status   08/09/2018 7.29 4.00 - 10.00 10*3/mm3 Final     RBC   Date Value Ref  Range Status   08/09/2018 5.12 4.30 - 5.50 10*6/mm3 Final     Hemoglobin   Date Value Ref Range Status   08/09/2018 15.5 13.5 - 16.5 g/dL Final     Hematocrit   Date Value Ref Range Status   08/09/2018 43.7 40.0 - 49.0 % Final     Platelets   Date Value Ref Range Status   08/09/2018 175 150 - 375 10*3/mm3 Final          Assessment/Plan        35-year-old male with no significant past medical history, though with a family history of factor V Leiden in his father, who, after a recent episode of minimal immobility, developed a right calf vein thrombosis.  There is no significant other provocative etiology for the patient's calf vein thrombosis and he is been treated over the last 3 weeks with Xarelto demonstrated rapid improvement to the point of resolution of his pain by the time he is seen for this consultation.  In review of additional symptoms he does have what sounds like an atypical migraine which rapidly improves over 1-2 days with rest and reduction of light.  He has no lasting neurologic deficits thereafter.  After a long discussion approximately 45 minutes per his family history and current social circumstances it is felt reasonable to have him tested in a more general sense for hypercoagulable state though expecting to at least be heterozygous for factor V Leiden.  In this way we could eliminate other hypercoagulable conditions at baseline, and also be able to provide information to he and his wife for any other genetic conditions for hypercoagulability that might affect their children.  This should also help us determine length of anticoagulation which is felt to be at least 3 months at which time we'll reassess.  Doppler and if negative take him off Xarelto using prophylaxis for subsequent episodes where he may have any immobility or perioperative status.  He is agreeable to this plan and hypercoagulable assessment will be drawn this afternoon.  He'll be seen back in follow-up in 3-4 weeks to review these  results.  A prescription for Xarelto at 20 mg by mouth Daily was E-scribed to his pharmacy.       The patient's hypercoagulable workup proceeded and he is seen back June 21, 2018 reviewing the results and finding only heterozygosity for factor V Leiden.  He continues to do well on Xarelto when discussed a 3 month treatment duration.  Fortunately additional samples were available for him to nearly complete his treatment schedule.  We plan follow-up Doppler examinations of this has not occurred as he is seen August 16.  We went on to reschedule examination which was performed September 6 demonstrating chronic right lower extremity DVT in the gastrocnemius/soleal and superficial venous valvular incompetence of the right saphenofemoral junction.  Xarelto was to be maintained for prophylactic use.      Subsequent appointments were delayed and or rescheduled until the patient's recontact May 14, 2020.  This is done by telephone and he is using Xarelto 10 mg fairly frequently for prophylactic use.  We have discussed COVID 19 and its potential hypercoagulable features and his need to have Xarelto available under the circumstances.  He agrees and also wishes to be seen back periodically even though he lives in [facility].  As result of changing office visits we have agreed that a follow-up video visit in 1 year would be appropriate though I also would call him back if he needed any other additional information or wished a further discussion.    Plan:  *Patient to continue Xarelto 10 mg p.o. daily for prophylaxis in terms of immobility, prolonged travel, perioperatively (after discussion)    *Xarelto 10 mg tablets #90, 3 refills has been E scribed to his mail order pharmacy    *1 year follow-up video visit    *Patient will call as needed as well      Unable to complete visit using a video connection to the patient. A phone visit was used to complete this visits. Total time of discussion was 20 minutes.

## 2020-09-22 ENCOUNTER — TELEPHONE (OUTPATIENT)
Dept: ONCOLOGY | Facility: CLINIC | Age: 35
End: 2020-09-22

## 2020-09-22 NOTE — TELEPHONE ENCOUNTER
Patient called for refill on     rivaroxaban (XARELTO) 10 mg tablet    Patient has a new pharmacy and a new prescription needs to be sent     [facility]  [address] [facility] [address] [phone]

## 2021-06-07 NOTE — PROGRESS NOTES
Subjective This patient has now moved to [facility], video visit requested      REASON FORFOLLOWUP:  Right lower extremity DVT      History of Present Illness     The patient is a 36-year-old male with little past medical history who it noted just prior to emergency room visit beginning of May, 2018 development of pain in his right lower extremity for 3 days.  This was worsening with movment and he became concerned there being a family history of factor V Leiden's father.  A Doppler examination in the emergency room showed acute right lower extremity DVT and gastrocnemius/soleal and the patient was placed on Xarelto.  Testing for factor V Leiden does not appear to have been obtained.      The patient describes that his father is positive for factor V Leiden and that this is been known for many years with his father being treated long-term on warfarin.  As result of needing orthopedic procedures and bridging between these surgeries he has an IVC filter also in place there being instances where he evidently developed new thrombi.  There is no significant family history otherwise though his mother does have cardiovascular disease and is status post pacemaker placement.    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  His current occupation has him traveling frequently by  car for short trips with her also occasions, including prior to his development of DVT, where he was relatively sedentary for many hours over several days at a desk.  He did not have any longer trips in and around the development of his DVT where he was immobile, had had no recent surgery or injury that led to immobility.  He does describe previously injuring his ankle many years ago and being mobilized without DVT .  He is, for the most part, reasonably active in exercise including basketball.    As to his recent development of DVT.  Noted gradual pain in his right calf  over several days, again during periods of working at a desk, until the pain became further significant and concerning for DVT.  The patient talked with his father at this point to confirm his suspicions and then sought evaluation.  The patient was placed on Xarelto, as noted above, and is just completing twice a day dosing for 3 weeks.  This pain has significantly improved to the point of of resolution.  His return to normal activity and additionally notes that his wife is pregnant with their first child.      At the patient's initial assessment he was asked to undergo a hypercoagulable workup to be certain of any confounding additional very other than likely factor V Leiden.  Fortunately these results were negative except for his heterozygosity factor V Leiden mutation ([id number]).  He continues to do well on Xarelto at this point and we discussed a 3 month treatment plan at which time he'll be reviewed by repeat Doppler examinations.    The patient is next seen August 16, 2018.  He did not have follow-up Doppler ultrasounds with uncertain about residual thrombosis or not.  We have discussed the need to determine resolution with Xarelto under the circumstance to know about discontinuance of the medication and/or its use in a prophylactic fashion.  The patient's subsequent Doppler examination September 6 revealed superficial venous valvular incompetence in the right saphenofemoral junction and chronic right lower extremity DVT in the gastrocnemius/soleal.  It was agreed, thereafter, that he would use Xarelto 10 mg p.o. daily if he had any degree of immobilization including longer-term travel of over 2 hours.  The patient did not keep regular appointments but was contacted for follow-up now (2020, and has moved to [facility] and been contacted by video visit which was not successful and converted to a telephone call.      He agrees to the call and we have discussed his history over the last at least year which  includes the use of Xarelto prophylactically in the above circumstances since he continues to travel and selling life insurance.  COVID-Candace has changed this and he works from home primarily but, as result of concern about pain in his extremities on occasion he has used Xarelto at least 2-3 times a week particularly over the last month.  This includes his relocation now in [facility].  We discussed the COVID 19 appears to produce a hypercoagulable condition and that his use the medication is encouraged if there is even a small degree of immobility at this point and that he should have a supply available.  The patient is contacted 1 year later by video visit indicating that he has used Xarelto quite frequently as result of his work which had previously been travel now more frequently done by long visits with him at a desk in his home.  He has not had any thrombotic episodes but is immobile sometimes for 2 to 3 hours at a time and thus the use of Xarelto almost daily.  He has had no thrombotic issues nor hemorrhagic complications.    Past Medical History:   Diagnosis Date   • DVT (deep venous thrombosis) (CMS/[facility])     Right lower extremity        No past surgical history on file.     Current Outpatient Medications on File Prior to Visit   Medication Sig Dispense Refill   • rivaroxaban (Xarelto) 10 MG tablet Take 1 tablet by mouth Daily. 90 tablet 3     No current facility-administered medications on file prior to visit.        ALLERGIES:  No Known Allergies     Social History     Socioeconomic History   • Marital status: Significant Other     Spouse name: Sloane Jenkins   • Number of children: Not on file   • Years of education: College   • Highest education level: Not on file   Tobacco Use   • Smoking status: Former Smoker     Types: Cigarettes   • Smokeless tobacco: Never Used   Substance and Sexual Activity   • Alcohol use: Yes     Comment: occasionally   • Drug use: No   • Sexual activity: Defer         Family History   Problem Relation Age of Onset   • Factor V Leiden deficiency Father    • Heart disease Mother    • Hypertension Maternal Grandmother    • Hypertension Maternal Grandfather    • Hypertension Paternal Grandmother    • Hypertension Paternal Grandfather    • No Known Problems Brother         Review of Systems   Review of Systems   Constitutional: Negative for fever.   HENT: Negative for nosebleeds.    Eyes: Negative for visual disturbance.   Respiratory: Negative for shortness of breath.    Cardiovascular: Negative for chest pain.   Gastrointestinal: Negative for abdominal pain and blood in stool.   Genitourinary: Negative for hematuria.   Musculoskeletal: Resolution of lower extremity pain.  Neurological: Negative for light-headedness.  The patient does describe 1-2 times per year what may be an atypical migraine  Psychiatric/Behavioral: Negative for confusion  Objective     There were no vitals filed for this visit.  Current Status 8/16/2018   ECOG score 0       Physical Exam physical exam not performed May 14, the below is kept in place for reference                                                                                         constitutional: He is oriented to person, place, and time. No distress.   HENT:   Head: Normocephalic and atraumatic.   Eyes: EOM are normal.   Neck: Normal range of motion.   Musculoskeletal: Normal range of motion. He exhibits no edema.  There is no calf tenderness nor evidence of palpable cord for bilateral calf regions.    Neurological: He is alert and oriented to person, place, and time.                                                  Skin:  normal color, dry.   Psychiatric: Affect normal.   RECENT LABS:  Hematology WBC   Date Value Ref Range Status   08/09/2018 7.29 4.00 - 10.00 10*3/mm3 Final     RBC   Date Value Ref Range Status   08/09/2018 5.12 4.30 - 5.50 10*6/mm3 Final     Hemoglobin   Date Value Ref Range Status   08/09/2018 15.5 13.5 - 16.5 g/dL  Final     Hematocrit   Date Value Ref Range Status   08/09/2018 43.7 40.0 - 49.0 % Final     Platelets   Date Value Ref Range Status   08/09/2018 175 150 - 375 10*3/mm3 Final          Assessment/Plan        36-year-old male with no significant past medical history, though with a family history of factor V Leiden in his father, who, after a recent episode of minimal immobility, developed a right calf vein thrombosis.  There is no significant other provocative etiology for the patient's calf vein thrombosis and he is been treated over the last 3 weeks with Xarelto demonstrated rapid improvement to the point of resolution of his pain by the time he is seen for this consultation.  In review of additional symptoms he does have what sounds like an atypical migraine which rapidly improves over 1-2 days with rest and reduction of light.  He has no lasting neurologic deficits thereafter.  After a long discussion approximately 45 minutes per his family history and current social circumstances it is felt reasonable to have him tested in a more general sense for hypercoagulable state though expecting to at least be heterozygous for factor V Leiden.  In this way we could eliminate other hypercoagulable conditions at baseline, and also be able to provide information to he and his wife for any other genetic conditions for hypercoagulability that might affect their children.  This should also help us determine length of anticoagulation which is felt to be at least 3 months at which time we'll reassess.  Doppler and if negative take him off Xarelto using prophylaxis for subsequent episodes where he may have any immobility or perioperative status.  He is agreeable to this plan and hypercoagulable assessment will be drawn this afternoon.  He'll be seen back in follow-up in 3-4 weeks to review these results.  A prescription for Xarelto at 20 mg by mouth Daily was E-scribed to his pharmacy.       The patient's hypercoagulable workup  proceeded and he is seen back June 21, 2018 reviewing the results and finding only heterozygosity for factor V Leiden.  He continues to do well on Xarelto when discussed a 3 month treatment duration.  Fortunately additional samples were available for him to nearly complete his treatment schedule.  We plan follow-up Doppler examinations of this has not occurred as he is seen August 16.  We went on to reschedule examination which was performed September 6 demonstrating chronic right lower extremity DVT in the gastrocnemius/soleal and superficial venous valvular incompetence of the right saphenofemoral junction.  Xarelto was to be maintained for prophylactic use.      Subsequent appointments were delayed and or rescheduled until the patient's recontact May 14, 2020.  This is done by telephone and he is using Xarelto 10 mg fairly frequently for prophylactic use.  We have discussed COVID 19 and its potential hypercoagulable features and his need to have Xarelto available under the circumstances.  He agrees and also wishes to be seen back periodically even though he lives in [facility].  As result of changing office visits we have agreed that a follow-up video visit in 1 year would be appropriate though I also would call him back if he needed any other additional information or wished a further discussion.  Patient continues Xarelto 10 mg p.o. daily for prophylaxis in terms of mobility and is contacted 1 year later by video visit fortunately doing well with this approach.    Plan:  *Patient to continue Xarelto 10 mg p.o. daily for prophylaxis in terms of immobility, prolonged travel, perioperatively (after discussion)    *Xarelto 10 mg tablets #90, 3 refills has, again been E scribed to his mail order pharmacy    *1 year follow-up video visit with laboratory studies done 2 weeks prior to [facility].    *Patient will call as needed as well

## 2021-06-14 ENCOUNTER — TELEMEDICINE (OUTPATIENT)
Dept: ONCOLOGY | Facility: CLINIC | Age: 36
End: 2021-06-14

## 2021-06-14 DIAGNOSIS — I82.90 ACUTE DEEP VEIN THROMBOSIS (DVT) OF NON-EXTREMITY VEIN: ICD-10-CM

## 2021-06-14 DIAGNOSIS — D68.51 FACTOR V LEIDEN MUTATION (HCC): Primary | ICD-10-CM

## 2021-06-14 PROCEDURE — 99213 OFFICE O/P EST LOW 20 MIN: CPT | Performed by: INTERNAL MEDICINE

## 2022-07-07 RX ORDER — RIVAROXABAN 10 MG/1
TABLET, FILM COATED ORAL
Qty: 90 TABLET | Refills: 3 | Status: SHIPPED | OUTPATIENT
Start: 2022-07-07

## 2023-05-26 ENCOUNTER — TELEPHONE (OUTPATIENT)
Dept: ONCOLOGY | Facility: CLINIC | Age: 38
End: 2023-05-26

## 2023-05-26 NOTE — TELEPHONE ENCOUNTER
"  Caller: Nelson Feliciano    Relationship: Self    Best call back number: [phone]    What is the best time to reach you: ANY.LEAVE VM    Who are you requesting to speak with (clinical staff, provider,  specific staff member):   DR MARQUEZ/MUKUND/ \"[facility] [employer]\" SYSTEM        What was the call regarding: PATIENT NELSON IS CALLING BECAUSE HE IS HAVING ISSUES APPLYING FOR NEW LIFE INSURANCE. HE STATES THAT HIS BROTHER IS A DOCTOR IN [place] WHO USES THE [employer] SYSTEM. 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 HE STATES THAT HE KEEPS GETTING A CODE AND ERRORS AND THAT IT SHOWS HE HAS HAD \"TREATMENT 25 YEARS AGO\". HE HAS ALSO EXPLAINED THAT THE \"DDT NON EXTREMITY\" FOR HIS LEG  IS INCORRECT AND NEEDS DOCTOR JIMMY TO CHANGE IT TO \"DDT EXTREMITY\".      THE ERROR CODE LOOKS LIKE A \"DATE\"    AND HE SAID IT IS NOT \"COMPILING CORRECTLY\"    Do you require a callback: YES          "

## 2023-05-30 PROBLEM — I82.401 DEEP VEIN THROMBOSIS (DVT) OF RIGHT LOWER EXTREMITY: Status: ACTIVE | Noted: 2023-05-30

## 2023-05-30 PROBLEM — I82.90 ACUTE DEEP VEIN THROMBOSIS (DVT) OF NON-EXTREMITY VEIN: Status: RESOLVED | Noted: 2018-05-31 | Resolved: 2023-05-30

## 2023-05-30 NOTE — TELEPHONE ENCOUNTER
"Pt called stating that his diagnosis on the problem list in Mychart is incorrect. Advised pt this would be updated to \"right lower extremity DVT.\" also, he is getting \"error notices\" in his Mychart. Explained to pt we do not have control over MyChart issues but gave him a number for a My Chart support line. He v/u.   "

## 2023-09-15 RX ORDER — RIVAROXABAN 10 MG/1
TABLET, FILM COATED ORAL
Qty: 90 TABLET | Refills: 3 | OUTPATIENT
Start: 2023-09-15